# Patient Record
Sex: FEMALE | Employment: OTHER | URBAN - METROPOLITAN AREA
[De-identification: names, ages, dates, MRNs, and addresses within clinical notes are randomized per-mention and may not be internally consistent; named-entity substitution may affect disease eponyms.]

---

## 2019-08-01 ENCOUNTER — TRANSFERRED RECORDS (OUTPATIENT)
Dept: HEALTH INFORMATION MANAGEMENT | Facility: CLINIC | Age: 79
End: 2019-08-01

## 2019-08-02 ENCOUNTER — TRANSFERRED RECORDS (OUTPATIENT)
Dept: HEALTH INFORMATION MANAGEMENT | Facility: CLINIC | Age: 79
End: 2019-08-02

## 2019-08-02 LAB — EJECTION FRACTION: 63

## 2019-10-18 ENCOUNTER — TRANSFERRED RECORDS (OUTPATIENT)
Dept: HEALTH INFORMATION MANAGEMENT | Facility: CLINIC | Age: 79
End: 2019-10-18

## 2019-10-18 LAB
ALT SERPL-CCNC: 33 U/L (ref 14–63)
AST SERPL-CCNC: 23 U/L (ref 15–37)
CREAT SERPL-MCNC: 0.98 MG/DL (ref 0.51–0.95)
GFR SERPL CREATININE-BSD FRML MDRD: 56 ML/MIN/1.73ME2 (ref 60–150)
GLUCOSE SERPL-MCNC: 105 MG/DL (ref 74–100)
POTASSIUM SERPL-SCNC: 3.8 MMOL/L (ref 3.5–5.1)

## 2019-10-19 ENCOUNTER — HOSPITAL ENCOUNTER (OUTPATIENT)
Facility: CLINIC | Age: 79
Setting detail: OBSERVATION
Discharge: HOME | End: 2019-10-20
Attending: INTERNAL MEDICINE | Admitting: INTERNAL MEDICINE
Payer: MEDICARE

## 2019-10-19 DIAGNOSIS — R07.9 CHEST PAIN, UNSPECIFIED TYPE: Primary | ICD-10-CM

## 2019-10-19 LAB
ANION GAP SERPL CALCULATED.3IONS-SCNC: 4 MMOL/L (ref 3–14)
BUN SERPL-MCNC: 16 MG/DL (ref 7–30)
CALCIUM SERPL-MCNC: 9 MG/DL (ref 8.5–10.1)
CHLORIDE SERPL-SCNC: 108 MMOL/L (ref 94–109)
CO2 SERPL-SCNC: 27 MMOL/L (ref 20–32)
CREAT SERPL-MCNC: 0.62 MG/DL (ref 0.52–1.04)
GFR SERPL CREATININE-BSD FRML MDRD: 87 ML/MIN/{1.73_M2}
GLUCOSE SERPL-MCNC: 94 MG/DL (ref 70–99)
POTASSIUM SERPL-SCNC: 3.9 MMOL/L (ref 3.4–5.3)
SODIUM SERPL-SCNC: 139 MMOL/L (ref 133–144)
TROPONIN I SERPL-MCNC: 0.03 UG/L (ref 0–0.04)

## 2019-10-19 PROCEDURE — 25000132 ZZH RX MED GY IP 250 OP 250 PS 637: Mod: GY | Performed by: INTERNAL MEDICINE

## 2019-10-19 PROCEDURE — 36415 COLL VENOUS BLD VENIPUNCTURE: CPT

## 2019-10-19 PROCEDURE — 99213 OFFICE O/P EST LOW 20 MIN: CPT

## 2019-10-19 PROCEDURE — 99220 ZZC INITIAL OBSERVATION CARE,LEVL III: CPT | Performed by: INTERNAL MEDICINE

## 2019-10-19 PROCEDURE — 93010 ELECTROCARDIOGRAM REPORT: CPT | Performed by: INTERNAL MEDICINE

## 2019-10-19 PROCEDURE — 36415 COLL VENOUS BLD VENIPUNCTURE: CPT | Performed by: INTERNAL MEDICINE

## 2019-10-19 PROCEDURE — 80048 BASIC METABOLIC PNL TOTAL CA: CPT

## 2019-10-19 PROCEDURE — 93005 ELECTROCARDIOGRAM TRACING: CPT

## 2019-10-19 PROCEDURE — 84484 ASSAY OF TROPONIN QUANT: CPT | Performed by: INTERNAL MEDICINE

## 2019-10-19 PROCEDURE — G0378 HOSPITAL OBSERVATION PER HR: HCPCS

## 2019-10-19 RX ORDER — NITROGLYCERIN 0.4 MG/1
0.4 TABLET SUBLINGUAL EVERY 5 MIN PRN
Status: DISCONTINUED | OUTPATIENT
Start: 2019-10-19 | End: 2019-10-20 | Stop reason: HOSPADM

## 2019-10-19 RX ORDER — ATORVASTATIN CALCIUM 40 MG/1
20 TABLET, FILM COATED ORAL EVERY EVENING
COMMUNITY

## 2019-10-19 RX ORDER — ASPIRIN 81 MG/1
81 TABLET ORAL AT BEDTIME
Status: DISCONTINUED | OUTPATIENT
Start: 2019-10-19 | End: 2019-10-20 | Stop reason: HOSPADM

## 2019-10-19 RX ORDER — NALOXONE HYDROCHLORIDE 0.4 MG/ML
.1-.4 INJECTION, SOLUTION INTRAMUSCULAR; INTRAVENOUS; SUBCUTANEOUS
Status: DISCONTINUED | OUTPATIENT
Start: 2019-10-19 | End: 2019-10-20 | Stop reason: HOSPADM

## 2019-10-19 RX ORDER — MULTIPLE VITAMINS W/ MINERALS TAB 9MG-400MCG
1 TAB ORAL DAILY
COMMUNITY

## 2019-10-19 RX ORDER — CLOPIDOGREL BISULFATE 75 MG/1
75 TABLET ORAL DAILY
Status: DISCONTINUED | OUTPATIENT
Start: 2019-10-19 | End: 2019-10-20 | Stop reason: HOSPADM

## 2019-10-19 RX ORDER — LIDOCAINE 40 MG/G
CREAM TOPICAL
Status: DISCONTINUED | OUTPATIENT
Start: 2019-10-19 | End: 2019-10-20 | Stop reason: HOSPADM

## 2019-10-19 RX ORDER — ATORVASTATIN CALCIUM 10 MG/1
20 TABLET, FILM COATED ORAL EVERY EVENING
Status: DISCONTINUED | OUTPATIENT
Start: 2019-10-19 | End: 2019-10-20 | Stop reason: HOSPADM

## 2019-10-19 RX ORDER — LACTOBACILLUS RHAMNOSUS GG 10B CELL
1 CAPSULE ORAL DAILY
COMMUNITY

## 2019-10-19 RX ORDER — ALUMINA, MAGNESIA, AND SIMETHICONE 2400; 2400; 240 MG/30ML; MG/30ML; MG/30ML
30 SUSPENSION ORAL EVERY 4 HOURS PRN
Status: DISCONTINUED | OUTPATIENT
Start: 2019-10-19 | End: 2019-10-20 | Stop reason: HOSPADM

## 2019-10-19 RX ORDER — ACETAMINOPHEN 325 MG/1
650 TABLET ORAL EVERY 4 HOURS PRN
Status: DISCONTINUED | OUTPATIENT
Start: 2019-10-19 | End: 2019-10-20 | Stop reason: HOSPADM

## 2019-10-19 RX ORDER — ASPIRIN 81 MG/1
81 TABLET ORAL DAILY
COMMUNITY

## 2019-10-19 RX ORDER — OXYCODONE HYDROCHLORIDE 5 MG/1
5-10 TABLET ORAL
Status: DISCONTINUED | OUTPATIENT
Start: 2019-10-19 | End: 2019-10-20 | Stop reason: HOSPADM

## 2019-10-19 RX ORDER — CLOPIDOGREL BISULFATE 75 MG/1
75 TABLET ORAL EVERY EVENING
COMMUNITY

## 2019-10-19 RX ORDER — ACETAMINOPHEN 650 MG/1
650 SUPPOSITORY RECTAL EVERY 4 HOURS PRN
Status: DISCONTINUED | OUTPATIENT
Start: 2019-10-19 | End: 2019-10-20 | Stop reason: HOSPADM

## 2019-10-19 RX ADMIN — ATORVASTATIN CALCIUM 20 MG: 10 TABLET, FILM COATED ORAL at 21:20

## 2019-10-19 RX ADMIN — CLOPIDOGREL BISULFATE 75 MG: 75 TABLET ORAL at 08:38

## 2019-10-19 RX ADMIN — ASPIRIN 81 MG: 81 TABLET, DELAYED RELEASE ORAL at 21:21

## 2019-10-19 ASSESSMENT — COLUMBIA-SUICIDE SEVERITY RATING SCALE - C-SSRS
2. HAVE YOU ACTUALLY HAD ANY THOUGHTS OF KILLING YOURSELF IN THE PAST MONTH?: NO
1. IN THE PAST MONTH, HAVE YOU WISHED YOU WERE DEAD OR WISHED YOU COULD GO TO SLEEP AND NOT WAKE UP?: NO

## 2019-10-19 NOTE — PHARMACY-ADMISSION MEDICATION HISTORY
Admission medication history interview status for the 10/19/2019  admission is complete. See EPIC admission navigator for prior to admission medications     Medication history source reliability:Good    Actions taken by pharmacist (provider contacted, etc):Reviewed patient's CareEverywhere records through [facility] and patient was further able to report medications.      Additional medication history information not noted on PTA med list :None    Medication reconciliation/reorder completed by provider prior to medication history? No    Time spent in this activity: 15 minutess    Prior to Admission medications    Medication Sig Last Dose Taking? Auth Provider   APPLE CIDER VINEGAR PO Take 1 tablet by mouth daily 10/18/2019 at Unknown time Yes Unknown, Entered By History   aspirin 81 MG EC tablet Take 81 mg by mouth daily 10/18/2019 at Unknown time Yes Unknown, Entered By History   atorvastatin (LIPITOR) 40 MG tablet Take 20 mg by mouth every evening 10/18/2019 at pm Yes Unknown, Entered By History   cholecalciferol (VITAMIN D3) 5000 units TABS tablet Take 5,000 Units by mouth daily 10/18/2019 at Unknown time Yes Unknown, Entered By History   clopidogrel (PLAVIX) 75 MG tablet Take 75 mg by mouth every evening 10/18/2019 at pm Yes Unknown, Entered By History   GLUCOSAMINE-CHONDROITIN-MSM PO Take 1 tablet by mouth daily (OTC: Pt unsure of strength) 10/18/2019 at Unknown time Yes Unknown, Entered By History   lactobacillus rhamnosus, GG, (CULTURELL) capsule Take 1 capsule by mouth daily 10/18/2019 at Unknown time Yes Unknown, Entered By History   LUTEIN PO Take 1 tablet by mouth daily 10/18/2019 at Unknown time Yes Unknown, Entered By History   MAGNESIUM PO Take 1 tablet by mouth daily (OTC: Patient unsure of strength) 10/18/2019 at Unknown time Yes Unknown, Entered By History   multivitamin w/minerals (MULTI-VITAMIN) tablet Take 1 tablet by mouth daily 10/18/2019 at Unknown time Yes Unknown, Entered By History   Turmeric  (CURCUMIN 95 PO) Take 1 tablet by mouth daily 10/18/2019 at Unknown time Yes Unknown, Entered By History

## 2019-10-19 NOTE — PLAN OF CARE
Pt is A&OX4, VSS on RA, denies pain this shift, up ind, amb to bathroom and voiding O.K. Cardiac diet ,no caffeine.Tele-SR/SB.Cardiology following, plan is echo stress test in the morning.PIV s/l.

## 2019-10-19 NOTE — PLAN OF CARE
- Serial troponins and stress test complete. - pending   - Seen and cleared by consultant if applicable - no  - Adequate pain control on oral analgesia - yes  - Vital signs normal or at patient baseline - yes  - Safe disposition plan has been identified - no         Pt arrived at 0430am, pt AOx4, VSS, denies pain, CMS intact, tele S linette, pending cardiology consult and trope trend.

## 2019-10-19 NOTE — PROGRESS NOTES
Update note:     Patient seen and examined.  No further chest pain or difficulty breathing.  Feeling near baseline.  Troponins have not been elevated    Plan:  - Cardiology following.  Plan for stress echo tomorrow.  If positive will proceed with angiogram.  If negative will add long acting nitrate    Vic Shelton DO   Hospitalist

## 2019-10-19 NOTE — H&P
[facility]    History and Physical - Hospitalist Service       Date of Admission:  10/19/2019    Assessment & Plan   Thalia Blanca is a 79 year old female admitted on 10/19/2019. She presents as a direct admission from [facility] for evaluation of chest pain reminiscent of prior angina resulting in drug-eluting stent placement August 2019.    Chest pain: Patient experienced chest discomfort which was central, radiated to bilateral shoulders and arms.  Rated 6/10, though resolved to 0/10 following nitroglycerin administration.  Similar discomfort this past summer prior to coronary angiogram through [facility] where a 65% mid left circumflex lesion was stented.  Reports that she has been adherent with her aspirin, Plavix, and statin therapy, though note that statin recently underwent dose reduction as patient was experiencing cramps.  History of smoking, quit approximately 6 years ago per her report.  History of mild rheumatoid arthritis not on DMARD  -Atorvastatin 20 mg daily.  Exact home dose is unknown to patient and myself, not noted on home medications through [place] transfer.  As above, patient reports recent dose reduction  -Continue 81 mg aspirin at bedtime.  Patient received 324 mg aspirin prior to transfer  -Continue 75 mg Plavix every morning  -Completing troponin trend.  Note that initial troponin obtained approximately 2330 at [facility] undetectable, 2-hour troponin at the upper limit of normal, though still within normal limits  -Cardiology consulted given patient's recent coronary angiography in August 2019 with only residual disease noted of 40% proximal RCA lesion.  Pending troponin trend, defer need for stress imaging versus angiography to cardiology service versus empiric treatment for angina/vasospasm.  -Cardiac telemetry    Rheumatoid arthritis: Patient reports rheumatoid arthritis with primary symptoms in her feet and hands.   "Currently feels as though she has a flare in her right shoulder resulting in \"bursitis\", though this seems more consistent with impingement and associated muscular discomfort in her trapezius.  Patient reports that she treats her rheumatoid arthritis with supplements and has not required disease modifying agents.   -Acetaminophen as needed    Right shoulder pain: Separate from chest and bilateral arm pain and present for the past several weeks.  Exam is most consistent with AC joint arthritis/impingement with some muscular discomfort and trapezius potentially related to change in use.  Patient describes her shoulder pain as \"bursitis\", though no significant effusion, and no joint tenderness other than at AC joint.  Patient reports that her pain is improved with heat, which also speaks to more likely muscular injury as compared to rheumatoid arthritis flare.  -Aqua K pad as needed       Diet: Regular diet no caffeine  DVT Prophylaxis: Ambulate  [place] Catheter: not present  Code Status: DNR/DNI.  Confirmed with patient on admission.    Disposition Plan   Expected discharge: Today, recommended to prior living arrangement once Cleared by cardiology pending troponin trend..  Entered: Francisco Jensen MD 10/19/2019, 4:31 AM     The patient's care was discussed with the Patient and Transferring emergency department at [facility].    Francisco Jensen MD  [facility]    ______________________________________________________________________    Chief Complaint   Chest discomfort radiating to bilateral arms and reminiscent of prior symptoms preceding drug eluding stent placement to left circumflex    History is obtained from patient, review of outside records transferred from [facility] including August 2019 coronary angiogram results, discussion with transferring emergency department provider    History of Present Illness   Thalia Blanca is a 79 year old female who presented to " [facility] for evaluation of chest discomfort.  Patient reports that she went to bed this evening, and was awoken from sleep at approximately 9:30 PM with central chest tightness radiating to bilateral shoulders.  She took a nitroglycerin, and her discomfort went from 6/10 down to 0/10.  Patient did not notice significant shortness of breath or diaphoresis associated with this.  No positional component.  Patient reports she had similar discomfort this past summer which actually resulted in coronary angiogram as an outpatient August 2019.  This demonstrated a 40% mid RCA lesion as well as a 65% mid circumflex lesion with IFR of 0.81 and post adenosine FFR of 0.80 resulting in drug-eluting stent placement with post stenosis reduction to 0%.  Note that patient had an episode of chest discomfort radiating to her right shoulder in 2012 while active with family in the [place] area.  A coronary angiogram was performed at that time as well which reportedly demonstrated a 40% mid LAD lesion by August preoperative history and physical.  Patient tells me that there was no change to her 40% lesion suggesting that this was actually a 40% RCA lesion and this report contains a typo.  I do not have patient's 2012 coronary angiogram findings directly.  2019 coronary angiogram findings describe minimal disease of LAD and left main.     patient reports that she has been adherent with her aspirin and Plavix and has not missed doses.  Was prescribed a 90-day supply following her August angiogram and has not run out yet.  Patient is also on atorvastatin, though she does not know the dose.  She tells me that this was reduced by one half approximately 2 weeks ago she was experiencing some cramping and discomfort unclear if this is related to rheumatoid arthritis.    EKG at outside facility demonstrated normal sinus rhythm without ST elevations to suggest acute in-stent thrombosis.      At time of arrival at [facility], patient remains chest pain-free.    Discussed potential etiologies for chest discomfort.  It is reassuring that she has no significant EKG changes in the setting of recent coronary angiogram at that she has been adherent with her aspirin and Plavix.  Other significant disease appears to have been stable on angiography with 40% mid RCA lesion.  Seems less likely that this would have progressed significantly since August.  Certainly consider vasospasm in this patient with recent coronary angiogram.  Given questionable utility of stress imaging with recent angiography, have consulted cardiology.  Troponin trend will be helpful.  Note that at outside hospital initial troponin obtained at approximately 11:30 PM was undetectable.  Second troponin obtained at 1:17 AM by report was at the upper limit of normal, potentially representing a significant delta troponin to suggest ischemia, though may also represent vasospasm.    Review of Systems    The 10 point Review of Systems is negative other than noted in the HPI or here.  No fevers or chills  No abdominal pain   no diarrhea    Past Medical History    I have reviewed this patient's medical history and updated it with pertinent information if needed.   Rheumatoid arthritis  Coronary artery disease    Past Surgical History   I have reviewed this patient's surgical history and updated it with pertinent information if needed.  Tubal ligation and prophylactic appendectomy  Tonsillectomy in childhood    Social History   I have reviewed this patient's social history and updated it with pertinent information if needed.  Social History     Tobacco Use     Smoking status:  Quit smoking approximately 6 years ago per her report.  By recent preprocedural H&P, quit smoking in 2010.   Substance Use Topics     Alcohol use: No     Drug use: No       Family History   I have reviewed this patient's family history and updated it with pertinent information if needed.   Mother with  multiple TIAs and strokes as well as heart failure in her 70s.    Prior to Admission Medications   81 mg aspirin in the evening, 75 mg Plavix in the morning, atorvastatin of unknown dose, supplements     Allergies   No known allergies.  Patient may have some degree of statin intolerance as she recently underwent a dose reduction.  Primary care records not currently available.    Physical Exam   Vital signs:  Temp: 98.1  F (36.7  C) Temp src: Oral BP: 121/54 Pulse: 53   Resp: 15 SpO2: 96 % O2 Device: None (Room air)          General Appearance: Well-appearing 79-year-old female laying comfortably in bed.  Eyes: No scleral icterus or injection.  HEENT: Normocephalic.  Respiratory: Breath sounds are clear bilaterally without wheezes or crackles.  Cardiovascular: Regular rate and rhythm, no murmur.  Heart rate in the 60 range.  GI: Abdomen soft, nontender.  No palpable mass  Lymph/Hematologic: Trace bilateral pedal edema present  Skin: No rashes.  Thin skin of feet bilaterally with telangiectasias at base of great toes bilaterally.  Musculoskeletal: Patient with right trapezius muscular discomfort on palpation.  No bony spine tenderness midline.   Right shoulder without significant effusion, and has reproducible AC joint tenderness.  Pain w/ abduction against resistance.   Neurologic: Alert, conversant, appropriate conversation.  Mental status grossly intact.  Psychiatric: Normal affect, pleasant    Data   Data reviewed today: I reviewed all medications, new labs and imaging results over the last 24 hours. I personally reviewed the EKG tracing showing Normal sinus rhythm, additional EKG with sinus bradycardia.    Recent Labs   Lab 10/19/19  0536   TROPI 0.028

## 2019-10-19 NOTE — PLAN OF CARE
Observation goals PRIOR TO DISCHARGE     Comments: List all goals to be met before discharge home:     - Serial troponins and stress test complete-Not met     - Seen and cleared by consultant if applicable -No    - Adequate pain control on oral analgesia-Met     - Vital signs normal or at patient baseline -Not met, systolic elevated    - Safe disposition plan has been identified -Met    - Nurse to notify provider when observation goals have been met and patient is ready for discharge.

## 2019-10-19 NOTE — PLAN OF CARE
PRIMARY DIAGNOSIS: CHEST PAIN  OUTPATIENT/OBSERVATION GOALS TO BE MET BEFORE DISCHARGE:  1. Ruled out acute coronary syndrome (negative or stable Troponin):  Yes  2. Pain Status: Improved-controlled with oral pain medications.  3. Appropriate provocative testing performed: No  - Stress Test Procedure: Echo  - Interpretation of cardiac rhythm per telemetry tech: SR     4. Cleared by Consultants (if applicable):No  5. Return to near baseline physical activity: No  Discharge Planner Nurse   Safe discharge environment identified: Yes  Barriers to discharge: Yes       Entered by: Amrita Chamberlain 10/19/2019 10:43 AM      Pt Aox4, VSS on RA, independent in room. Pt denies any chest pain at this time. Pt voiding w/o difficulty. PIV SL; dressing CDI. Pt has stress echo tomorrow; discharge pending results of test.

## 2019-10-19 NOTE — CONSULTS
[facility]    History and Physical  Cardiology     Date of Admission:  10/19/2019    Assessment & Plan   Thalia Blanca is a 79 year old female who presents with chest pain which woke her from sleep.  She has a past medical history of coronary artery disease status post drug-eluting stent to the circumflex artery in August 2019. She was admitted to observation.     # Chest Pain  # Coronary artery disease s/p CHRIS to LCx in August 2019  Patient presents with resting chest pain concerning for angina. She has had 2 episodes in the last week or so which could be concerning for stuttering angina or unstable angina.  Her troponin enzymes are within normal limits x2, there is concern for possible delta at the outside hospital though I do not have these numbers.  She has never had troponins above the upper limit of normal.  Beyond that she is not having any exertional symptoms rather it is waking her from sleep. The quality of the pain is somewhat anginal but cannot rule out esophageal spasm as well given bilateral should and neck pain.  She has no history of GERD.  Pericarditis is considered given history of RA, but is less likely given there is no stabbing pain similar to previous exertional symptoms. BP well controlled at this time.  It is notable patient had chest pain subsequent to her cath which was resolved with nitroglycerin, we do not have these images, question of whether there could have been a branch vessel occluded with other issues post cath. Coronary vasospasm could also be considered.   - Continue aspirin and Plavix.   - Continue atorvastatin.   - Trend troponin's and repeat ECG if recurrent pain  - Continue PRN nitroglycerin for pain  - Will Plan for stress echo tomorrow.   - If negative, will plan to add long acting nitrate to regimen. If positive plan for repeat coronary angiogram.     Staffed with Dr. Valente.     Disposition Plan   Expected discharge in 1 days once stress testing  completed.     David Caballero MD    Primary Care Physician   No primary care provider on file.    Chief Complaint   Chest pain    History is obtained from the patient and chart review.     History of Present Illness   Thalia Blanca is a 79 year old female who presents with chest pain which woke her from sleep.  The patient has a past medical history of coronary artery disease, rheumatoid arthritis, hyperlipidemia.    Patient notes that last night she developed central chest  Pain and tightness that radiated to bilateral shoulders as well as her neck.  This was 6 out of 10 and awoke her from sleep.  She took sublingual nitroglycerin with resolution of her symptoms.  She reports having this approximately 1 to 2 weeks ago as well, when she has the pain it does resolve with sublingual nitro.  In the end of July of this year she developed exertional angina similar to these pains, because of that she underwent coronary angiography which revealed a 40% RCA lesion and a 65% circumflex lesion which was positive by IFR and FFR.  She underwent drug-eluting stent placement.  She does note that after that stent was placed she had severe right-sided arm pain which eventually resolved with nitroglycerin.  Of note there is reports of a 2012 coronary angiogram with an LAD 40% lesion however there is no LAD lesion noted on the more recent coronary angiogram and may have been a typo.    The patient is on aspirin and Plavix, she is been compliant with these medications.  She also takes atorvastatin.  The patient has multiple painful joints due to rheumatoid arthritis.    The patient presented to outside emergency department, ECG at that time was normal sinus rhythm without concerns for ST elevations.  She had a undetectable troponin followed by a second upper limit of normal troponin.  Troponins here have been 0.026 and 0.028.  The patient is chest pain-free at this time.  She denies any dyspnea.  The patient notes that since her  stent her exertional anginal symptoms have improved.    Past Medical History    Past medical history reviewed.     Past Surgical History   I have reviewed this patient's surgical history and updated it with pertinent information if needed.    Prior to Admission Medications   Prior to Admission Medications   Prescriptions Last Dose Informant Patient Reported? Taking?   APPLE CIDER VINEGAR PO 10/18/2019 at Unknown time Self Yes Yes   Sig: Take 1 tablet by mouth daily   GLUCOSAMINE-CHONDROITIN-MSM PO 10/18/2019 at Unknown time Self Yes Yes   Sig: Take 1 tablet by mouth daily (OTC: Pt unsure of strength)   LUTEIN PO 10/18/2019 at Unknown time Self Yes Yes   Sig: Take 1 tablet by mouth daily   MAGNESIUM PO 10/18/2019 at Unknown time Self Yes Yes   Sig: Take 1 tablet by mouth daily (OTC: Patient unsure of strength)   Turmeric (CURCUMIN 95 PO) 10/18/2019 at Unknown time Self Yes Yes   Sig: Take 1 tablet by mouth daily   aspirin 81 MG EC tablet 10/18/2019 at Unknown time Self Yes Yes   Sig: Take 81 mg by mouth daily   atorvastatin (LIPITOR) 40 MG tablet 10/18/2019 at pm Self Yes Yes   Sig: Take 20 mg by mouth every evening   cholecalciferol (VITAMIN D3) 5000 units TABS tablet 10/18/2019 at Unknown time Self Yes Yes   Sig: Take 5,000 Units by mouth daily   clopidogrel (PLAVIX) 75 MG tablet 10/18/2019 at pm Self Yes Yes   Sig: Take 75 mg by mouth every evening   lactobacillus rhamnosus, GG, (CULTURELL) capsule 10/18/2019 at Unknown time Self Yes Yes   Sig: Take 1 capsule by mouth daily   multivitamin w/minerals (MULTI-VITAMIN) tablet 10/18/2019 at Unknown time Self Yes Yes   Sig: Take 1 tablet by mouth daily      Facility-Administered Medications: None     Allergies   No Known Allergies    Social History   I have reviewed this patient's social history and updated it with pertinent information if needed.  The patient admits to a 20-pack-year history of smoking but is not an active smoker, she drinks a glass of wine a  day.    Family History   Positive for coronary disease in a brother and a sister, unspecified heart disease in her mother.    Review of Systems   The 10 point Review of Systems is negative other than noted in the HPI or here.    Physical Exam   Temp: 96.8  F (36  C) Temp src: Oral BP: 128/58 Pulse: 63   Resp: 16 SpO2: 97 % O2 Device: None (Room air)    Vital Signs with Ranges  Temp:  [96.8  F (36  C)-98.1  F (36.7  C)] 96.8  F (36  C)  Pulse:  [53-63] 63  Resp:  [15-16] 16  BP: (121-128)/(54-58) 128/58  SpO2:  [96 %-97 %] 97 %  0 lbs 0 oz    Constitutional: Awake, alert, cooperative, no apparent distress.  Eyes: Conjunctiva and pupils examined and normal.  HEENT: Moist mucous membranes, normal dentition.  Respiratory: Clear to auscultation bilaterally, no crackles or wheezing.  Cardiovascular: Regular rate and rhythm, normal S1 and S2, and no murmur noted. Normal JVD.  GI: Soft, non-distended, non-tender, normal bowel sounds.  Lymph/Hematologic: No anterior cervical or supraclavicular adenopathy.  Skin: No rashes, no cyanosis, no edema.  Musculoskeletal: No joint swelling, erythema or tenderness. Pain to palpation of ankles, shoulders.   Neurologic: Cranial nerves 2-12 intact, normal strength and sensation.  Psychiatric: Alert, oriented to person, place and time, no obvious anxiety or depression.    Data   I personally reviewed the EKG tracing showing normal sinus rhythm with non specific ST changes, no concerns for acute ischemia.      Lab Results   Component Value Date    TROPI 0.026 10/19/2019    TROPI 0.028 10/19/2019

## 2019-10-19 NOTE — PLAN OF CARE
PRIMARY DIAGNOSIS: CHEST PAIN  OUTPATIENT/OBSERVATION GOALS TO BE MET BEFORE DISCHARGE:  1. Ruled out acute coronary syndrome (negative or stable Troponin):  Yes  2. Pain Status: Improved-controlled with oral pain medications.  3. Appropriate provocative testing performed: No  - Stress Test Procedure: Echo  - Interpretation of cardiac rhythm per telemetry tech: SR    4. Cleared by Consultants (if applicable):No  5. Return to near baseline physical activity: No  Discharge Planner Nurse   Safe discharge environment identified: Yes  Barriers to discharge: Yes       Entered by: Amrita Chamberlain 10/19/2019 10:43 AM     Please review provider order for any additional goals.   Nurse to notify provider when observation goals have been met and patient is ready for discharge.    Pt Aox4, VSS on RA, independent in room. Pt denies any chest pain at this time. Pt going for echo tomorrow. Plan to discharge pending test.

## 2019-10-20 ENCOUNTER — APPOINTMENT (OUTPATIENT)
Dept: CARDIOLOGY | Facility: CLINIC | Age: 79
End: 2019-10-20
Payer: MEDICARE

## 2019-10-20 VITALS
HEART RATE: 66 BPM | DIASTOLIC BLOOD PRESSURE: 70 MMHG | TEMPERATURE: 96.5 F | RESPIRATION RATE: 16 BRPM | SYSTOLIC BLOOD PRESSURE: 118 MMHG | OXYGEN SATURATION: 96 %

## 2019-10-20 PROCEDURE — G0378 HOSPITAL OBSERVATION PER HR: HCPCS

## 2019-10-20 PROCEDURE — 93018 CV STRESS TEST I&R ONLY: CPT | Performed by: INTERNAL MEDICINE

## 2019-10-20 PROCEDURE — 99213 OFFICE O/P EST LOW 20 MIN: CPT

## 2019-10-20 PROCEDURE — 93016 CV STRESS TEST SUPVJ ONLY: CPT | Performed by: INTERNAL MEDICINE

## 2019-10-20 PROCEDURE — 99217 ZZC OBSERVATION CARE DISCHARGE: CPT | Performed by: INTERNAL MEDICINE

## 2019-10-20 PROCEDURE — 93321 DOPPLER ECHO F-UP/LMTD STD: CPT | Mod: 26 | Performed by: INTERNAL MEDICINE

## 2019-10-20 PROCEDURE — 93325 DOPPLER ECHO COLOR FLOW MAPG: CPT | Mod: 26 | Performed by: INTERNAL MEDICINE

## 2019-10-20 PROCEDURE — 40000264 ECHO STRESS ECHOCARDIOGRAM

## 2019-10-20 PROCEDURE — 25000132 ZZH RX MED GY IP 250 OP 250 PS 637

## 2019-10-20 PROCEDURE — 25500064 ZZH RX 255 OP 636: Performed by: INTERNAL MEDICINE

## 2019-10-20 PROCEDURE — 25000132 ZZH RX MED GY IP 250 OP 250 PS 637: Mod: GY | Performed by: INTERNAL MEDICINE

## 2019-10-20 PROCEDURE — 93350 STRESS TTE ONLY: CPT | Mod: 26 | Performed by: INTERNAL MEDICINE

## 2019-10-20 RX ORDER — ISOSORBIDE MONONITRATE 30 MG/1
30 TABLET, EXTENDED RELEASE ORAL DAILY
Qty: 30 TABLET | Refills: 0 | Status: SHIPPED | OUTPATIENT
Start: 2019-10-21

## 2019-10-20 RX ORDER — ISOSORBIDE MONONITRATE 30 MG/1
30 TABLET, EXTENDED RELEASE ORAL DAILY
Status: DISCONTINUED | OUTPATIENT
Start: 2019-10-20 | End: 2019-10-20 | Stop reason: HOSPADM

## 2019-10-20 RX ADMIN — ISOSORBIDE MONONITRATE 30 MG: 30 TABLET, EXTENDED RELEASE ORAL at 11:47

## 2019-10-20 RX ADMIN — HUMAN ALBUMIN MICROSPHERES AND PERFLUTREN 9 ML: 10; .22 INJECTION, SOLUTION INTRAVENOUS at 08:45

## 2019-10-20 RX ADMIN — CLOPIDOGREL BISULFATE 75 MG: 75 TABLET ORAL at 07:53

## 2019-10-20 NOTE — PROGRESS NOTES
[facility]    Cardiology Progress Note     Assessment & Plan   Thalia Blanca is a 79 year old female who presents with chest pain which woke her from sleep.  She has a past medical history of coronary artery disease status post drug-eluting stent to the circumflex artery in August 2019. She was admitted to observation.      # Chest Pain  # Coronary artery disease s/p CHRIS to LCx in August 2019  Patient presents with resting chest pain concerning for angina. She has had 2 episodes in the last week or so which could be concerning for stuttering angina or unstable angina, though she has other pain syndrome and RA which cause her pain in many joints (ankles, knees, elbows, painful clavicular joint, etc..  Her troponin enzymes are within normal limits x3 and flat. Outside troponin reportely WNL as well. Beyond that she is not having any exertional symptoms rather it is waking her from sleep. The quality of the pain is somewhat anginal but cannot rule out esophageal spasm as well given bilateral should and neck pain.  She has no history of GERD. Pericarditis is considered given history of RA, but is less likely given there is no stabbing pain and similar to previous exertional symptoms. BP well controlled at this time.  It is notable patient had chest pain subsequent to her cath which was resolved with nitroglycerin, we do not have these images, question of whether there could have been a branch vessel occluded with other issues post cath. Coronary vasospasm could also be considered.   - Continue aspirin and Plavix.   - Continue atorvastatin.   - Trend troponin's and repeat ECG if recurrent pain  - Continue PRN nitroglycerin for pain  - Will Plan for stress echo today.   - If negative, will plan to add beta-blocker or long acting nitrate to regimen. If positive plan for repeat coronary angiogram.  - Follow up with primary cardiologist Dr. Price ([facility] [place]) will be needed in  1-2 weeks.        Disposition Plan   Expected discharge in 0-1 days to prior living arrangement pending negative stress testing.     Entered: David Caballero 10/20/2019, 8:20 AM       David Caballero MD  Text Page      Interval History   Did well overnight, no symptoms. Up and doing exercises this morning without issues. She denies chest pain or shortness of breath overnight. Plan for stress echo today discussed.     Physical Exam   Temp: 96.5  F (35.8  C) Temp src: Oral BP: (!) 129/94 Pulse: 66   Resp: 16 SpO2: 96 % O2 Device: None (Room air)    There were no vitals filed for this visit.  Vital Signs with Ranges  Temp:  [96  F (35.6  C)-97.1  F (36.2  C)] 96.5  F (35.8  C)  Pulse:  [63-69] 66  Resp:  [16-18] 16  BP: (118-155)/(63-94) 129/94  SpO2:  [96 %-98 %] 96 %  I/O last 3 completed shifts:  In: 240 [P.O.:240]  Out: -   There is no problem list on file for this patient.      Constitutional: Awake, alert, cooperative, no apparent distress. Walking around room doing exercises.   Respiratory: Clear to auscultation bilaterally, no crackles or wheezing  Cardiovascular: Regular rate and rhythm, normal S1 and S2, and no murmur noted  GI: Normal bowel sounds, soft, non-distended, non-tender  Skin/Integumen: No rashes, no cyanosis, trace ankle edema, tender ankles.     Medications       aspirin  81 mg Oral At Bedtime     atorvastatin  20 mg Oral QPM     clopidogrel  75 mg Oral Daily     sodium chloride (PF)  3 mL Intracatheter Q8H       Data   Results for orders placed or performed during the hospital encounter of 10/19/19 (from the past 24 hour(s))   Troponin I - Now then in 4 hours x 3   Result Value Ref Range    Troponin I ES 0.026 0.000 - 0.045 ug/L   EKG 12-lead, tracing only   Result Value Ref Range    Interpretation ECG Click View Image link to view waveform and result    Basic metabolic panel   Result Value Ref Range    Sodium 139 133 - 144 mmol/L    Potassium 3.9 3.4 - 5.3 mmol/L    Chloride 108 94 - 109  mmol/L    Carbon Dioxide 27 20 - 32 mmol/L    Anion Gap 4 3 - 14 mmol/L    Glucose 94 70 - 99 mg/dL    Urea Nitrogen 16 7 - 30 mg/dL    Creatinine 0.62 0.52 - 1.04 mg/dL    GFR Estimate 87 >60 mL/min/[1.73_m2]    GFR Estimate If Black >90 >60 mL/min/[1.73_m2]    Calcium 9.0 8.5 - 10.1 mg/dL   Troponin I - Now then in 4 hours x 3   Result Value Ref Range    Troponin I ES 0.028 0.000 - 0.045 ug/L

## 2019-10-20 NOTE — PLAN OF CARE
Observation goals PRIOR TO DISCHARGE     Comments: List all goals to be met before discharge home:     - Serial troponins and stress test complete-Not met, serial trops done, stress test scheduled      - Seen and cleared by consultant if applicable -No    - Adequate pain control on oral analgesia-Met     - Vital signs normal or at patient baseline -Met    - Safe disposition plan has been identified -Met    - Nurse to notify provider when observation goals have been met and patient is ready for discharge.        Pt is A&O x4, VSS on RA. No c/o chest pain, SOB or nausea. Right upper shoulder is painful, some relief with heat pack. Serial troponin done - WNL, Echo stress test scheduled for tomorrow. Tele is SR/SB. Tolerating cardiac diet, ambulates independent. Will continue to monitor.

## 2019-10-20 NOTE — DISCHARGE SUMMARY
[facility]  Hospitalist Discharge Summary       Date of Admission:  10/19/2019  Date of Discharge:  10/20/2019  Discharging Provider: Vic Shelton DO      Discharge Diagnoses   1. Chest pain, possibly stable angina  2. H/o CAD   3. Rheumatoid arthritis    Follow-ups Needed After Discharge   Follow-up Appointments     Follow-up and recommended labs and tests       Follow up with primary care provider, Physician No Ref-Primary, within   2-4 weeks, to evaluate medication change and for hospital follow- up. No   follow up labs or test are needed.  Follow up primary cardiologist in 1-2 weeks as recommended by cardiology   here           Unresulted Labs Ordered in the Past 30 Days of this Admission     No orders found for last 31 day(s).        Discharge Disposition   Discharged to home  Condition at discharge: Stable    Hospital Course   Patient admitted for chest pain rule out.  Serial troponins negative.  Stress echo done without evidence of ischemia.  Seen by cardiology who recommended starting Imdur and close follow up with patient's primary cardiologist in 1-2 weeks.  Discharged in stable condition.  Remained chest pain free while here    Consultations This Hospital Stay   CARDIOLOGY IP CONSULT    Code Status   DNR/DNI    Time Spent on this Encounter   I, Vic Shelton DO, personally saw the patient today and spent less than or equal to 30 minutes discharging this patient.       Vic Shelton DO  [facility]  ______________________________________________________________________    Physical Exam   Vital Signs: Temp: 96.5  F (35.8  C) Temp src: Oral BP: 118/70 Pulse: 66   Resp: 16 SpO2: 96 % O2 Device: None (Room air)    Weight: 0 lbs 0 oz  General Appearance: Resting comfortably.  NAD   Respiratory: Clear to auscultation.  No respiratory distress  Cardiovascular: RRR.  No murmurs  GI: Bowel sounds present.  Non-tender  Skin: No rashes.  No cyanosis  Other: No edema.   No calf tenderness         Primary Care Physician   Physician No Ref-Primary    Discharge Orders      Reason for your hospital stay    Atypical chest pain     Follow-up and recommended labs and tests     Follow up with primary care provider, Physician No Ref-Primary, within 2-4 weeks, to evaluate medication change and for hospital follow- up. No follow up labs or test are needed.  Follow up primary cardiologist in 1-2 weeks as recommended by cardiology here     Activity    Your activity upon discharge: activity as tolerated     Diet    Follow this diet upon discharge: Orders Placed This Encounter      Low Saturated Fat Na <2400 mg       Significant Results and Procedures   Most Recent 3 CBC's:No lab results found.  Most Recent 3 BMP's:  Recent Labs   Lab Test 10/19/19  1102      POTASSIUM 3.9   CHLORIDE 108   CO2 27   BUN 16   CR 0.62   ANIONGAP 4   JEVON 9.0   GLC 94   , No results found for this or any previous visit.    Discharge Medications   Current Discharge Medication List      START taking these medications    Details   isosorbide mononitrate (IMDUR) 30 MG 24 hr tablet Take 1 tablet (30 mg) by mouth daily  Qty: 30 tablet, Refills: 0    Comments: Future refills by PCP  Physician No Ref-Primary with phone number None.  Associated Diagnoses: Chest pain, unspecified type         CONTINUE these medications which have NOT CHANGED    Details   APPLE CIDER VINEGAR PO Take 1 tablet by mouth daily      aspirin 81 MG EC tablet Take 81 mg by mouth daily      atorvastatin (LIPITOR) 40 MG tablet Take 20 mg by mouth every evening      cholecalciferol (VITAMIN D3) 5000 units TABS tablet Take 5,000 Units by mouth daily      clopidogrel (PLAVIX) 75 MG tablet Take 75 mg by mouth every evening      GLUCOSAMINE-CHONDROITIN-MSM PO Take 1 tablet by mouth daily (OTC: Pt unsure of strength)      lactobacillus rhamnosus, GG, (CULTURELL) capsule Take 1 capsule by mouth daily      LUTEIN PO Take 1 tablet by mouth daily       MAGNESIUM PO Take 1 tablet by mouth daily (OTC: Patient unsure of strength)      multivitamin w/minerals (MULTI-VITAMIN) tablet Take 1 tablet by mouth daily      Turmeric (CURCUMIN 95 PO) Take 1 tablet by mouth daily           Allergies   No Known Allergies

## 2019-10-20 NOTE — PLAN OF CARE
Observation goals PRIOR TO DISCHARGE     Comments: List all goals to be met before discharge home:     - Serial troponins and stress test complete-Not met, serial trops WNL, stress test in am     - Seen and cleared by consultant if applicable -No    - Adequate pain control on oral analgesia-Met     - Vital signs normal or at patient baseline -Met    - Safe disposition plan has been identified -met    - Nurse to notify provider when observation goals have been met and patient is ready for discharge.     A&O x4, VSS on RA. No c/o chest pain. Serial troponin done - WNL, Echo stress test scheduled for today. Tele SR. Tolerating cardiac diet,  up independently. Will continue to monitor

## 2019-10-20 NOTE — PLAN OF CARE
Observation goals PRIOR TO DISCHARGE     Comments: List all goals to be met before discharge home:     - Serial troponins and stress test complete-Not met, serial trops WNL, stress test in am     - Seen and cleared by consultant if applicable -No    - Adequate pain control on oral analgesia-Met     - Vital signs normal or at patient baseline -Met    - Safe disposition plan has been identified -met    - Nurse to notify provider when observation goals have been met and patient is ready for discharge.

## 2019-10-20 NOTE — PLAN OF CARE
PRIMARY DIAGNOSIS: CHEST PAIN  OUTPATIENT/OBSERVATION GOALS TO BE MET BEFORE DISCHARGE:  1. Ruled out acute coronary syndrome (negative or stable Troponin):  Yes  2. Pain Status: Pain free.  3. Appropriate provocative testing performed: Yes  - Stress Test Procedure: Echo  - Interpretation of cardiac rhythm per telemetry tech: SR    4. Cleared by Consultants (if applicable):N/A  5. Return to near baseline physical activity: Yes  Discharge Planner Nurse   Safe discharge environment identified: Yes  Barriers to discharge: Yes       Entered by: Amrita Chamberlain 10/20/2019 10:47 AM     Please review provider order for any additional goals.   Nurse to notify provider when observation goals have been met and patient is ready for discharge.  Pt Aox4, VSS on RA, independent in room. Pt has denied pain throughout shift. PIV SL. Voiding w/o difficulty. Pt received echo stress test this AM; waiting on results.

## 2019-10-20 NOTE — PLAN OF CARE
PRIMARY DIAGNOSIS: CHEST PAIN  OUTPATIENT/OBSERVATION GOALS TO BE MET BEFORE DISCHARGE:  1. Ruled out acute coronary syndrome (negative or stable Troponin):  Yes  2. Pain Status: Pain free.  3. Appropriate provocative testing performed: Yes  - Stress Test Procedure: Echo  - Interpretation of cardiac rhythm per telemetry tech: SR     4. Cleared by Consultants (if applicable): Yes  5. Return to near baseline physical activity: Yes  Discharge Planner Nurse   Safe discharge environment identified: Yes  Barriers to discharge: Yes       Entered by: Amrita Chamberlain 10/20/2019 10:47 AM    Pt Aox4, VSS on RA, independent in room. Pt has denied pain throughout shift. Voiding w/o difficulty. Pt received echo stress test this AM; resulted negative. Cardiology gave the ok for discharge this afternoon r/t negative test. Discharge instructions and medications gone over with pt; all questions answered at this time. PIV discontinued prior to discharge. Tele disconnected prior to discharge. Pt discharged home around 1330 with .

## 2019-10-21 LAB — INTERPRETATION ECG - MUSE: NORMAL

## 2020-03-11 ENCOUNTER — HEALTH MAINTENANCE LETTER (OUTPATIENT)
Age: 80
End: 2020-03-11

## 2021-01-03 ENCOUNTER — HEALTH MAINTENANCE LETTER (OUTPATIENT)
Age: 81
End: 2021-01-03

## 2021-04-25 ENCOUNTER — HEALTH MAINTENANCE LETTER (OUTPATIENT)
Age: 81
End: 2021-04-25

## 2021-10-10 ENCOUNTER — HEALTH MAINTENANCE LETTER (OUTPATIENT)
Age: 81
End: 2021-10-10

## 2022-05-22 ENCOUNTER — HEALTH MAINTENANCE LETTER (OUTPATIENT)
Age: 82
End: 2022-05-22

## 2022-09-18 ENCOUNTER — HEALTH MAINTENANCE LETTER (OUTPATIENT)
Age: 82
End: 2022-09-18

## 2023-06-04 ENCOUNTER — HEALTH MAINTENANCE LETTER (OUTPATIENT)
Age: 83
End: 2023-06-04